# Patient Record
Sex: MALE | Race: WHITE | NOT HISPANIC OR LATINO | Employment: OTHER | URBAN - METROPOLITAN AREA
[De-identification: names, ages, dates, MRNs, and addresses within clinical notes are randomized per-mention and may not be internally consistent; named-entity substitution may affect disease eponyms.]

---

## 2018-07-20 ENCOUNTER — HOSPITAL ENCOUNTER (EMERGENCY)
Facility: HOSPITAL | Age: 43
Discharge: HOME | End: 2018-07-20
Attending: EMERGENCY MEDICINE
Payer: OTHER GOVERNMENT

## 2018-07-20 VITALS
HEART RATE: 71 BPM | SYSTOLIC BLOOD PRESSURE: 140 MMHG | RESPIRATION RATE: 16 BRPM | WEIGHT: 176 LBS | HEIGHT: 71 IN | DIASTOLIC BLOOD PRESSURE: 74 MMHG | OXYGEN SATURATION: 100 % | TEMPERATURE: 99 F | BODY MASS INDEX: 24.64 KG/M2

## 2018-07-20 DIAGNOSIS — M25.579 ANKLE PAIN: ICD-10-CM

## 2018-07-20 DIAGNOSIS — M25.472 LEFT ANKLE SWELLING: ICD-10-CM

## 2018-07-20 DIAGNOSIS — M25.572 LEFT ANKLE PAIN: Primary | ICD-10-CM

## 2018-07-20 PROCEDURE — 25000003 PHARM REV CODE 250: Performed by: EMERGENCY MEDICINE

## 2018-07-20 PROCEDURE — 99283 EMERGENCY DEPT VISIT LOW MDM: CPT

## 2018-07-20 PROCEDURE — 25000003 PHARM REV CODE 250: Performed by: PHYSICIAN ASSISTANT

## 2018-07-20 PROCEDURE — 99283 EMERGENCY DEPT VISIT LOW MDM: CPT | Mod: ,,, | Performed by: PHYSICIAN ASSISTANT

## 2018-07-20 RX ORDER — NAPROXEN 500 MG/1
500 TABLET ORAL
Status: COMPLETED | OUTPATIENT
Start: 2018-07-20 | End: 2018-07-20

## 2018-07-20 RX ORDER — HYDROCODONE BITARTRATE AND ACETAMINOPHEN 5; 325 MG/1; MG/1
1 TABLET ORAL EVERY 6 HOURS PRN
Qty: 5 TABLET | Refills: 0 | Status: SHIPPED | OUTPATIENT
Start: 2018-07-20 | End: 2018-07-30

## 2018-07-20 RX ORDER — NAPROXEN 500 MG/1
500 TABLET ORAL 2 TIMES DAILY WITH MEALS
Qty: 20 TABLET | Refills: 0 | Status: SHIPPED | OUTPATIENT
Start: 2018-07-20

## 2018-07-20 RX ORDER — HYDROCODONE BITARTRATE AND ACETAMINOPHEN 5; 325 MG/1; MG/1
1 TABLET ORAL
Status: COMPLETED | OUTPATIENT
Start: 2018-07-20 | End: 2018-07-20

## 2018-07-20 RX ADMIN — NAPROXEN 500 MG: 500 TABLET ORAL at 06:07

## 2018-07-20 RX ADMIN — HYDROCODONE BITARTRATE AND ACETAMINOPHEN 1 TABLET: 5; 325 TABLET ORAL at 04:07

## 2018-07-20 NOTE — ED TRIAGE NOTES
Presents to ER with pain and swelling to his left ankle.  States that he jumped off of a hand rail and roll his ankle.  Patient's name and date of birth checked and is correct.  LOC: The patient is awake, alert and aware of environment with an appropriate affect, the patient is oriented x 3 and speaking appropriately.  APPEARANCE: Patient resting comfortably and in no acute distress, patient is clean and well groomed, patient's clothing is properly fastened.  CARDIOVASCULAR:  Heart rate regular and even with no peripheral edema noted.  SKIN: The skin is warm and dry, patient has normal skin turgor and moist mucus membranes, skin intact, no breakdown or brusing noted.   MUSKULOSKELETAL: Patient moving all extremities well, obvious swelling and deformities noted left ankle.  RESPIRATORY: Airway is open and patent, respirations are spontaneous, patient has a normal effort and rate.

## 2018-07-20 NOTE — ED PROVIDER NOTES
Encounter Date: 7/20/2018       History     Chief Complaint   Patient presents with    Ankle Injury     jumped down from something and now c/o left ankle pain and swelling     41 y/o WM with no medical history presents to the ED c/o L lateral ankle pain and swelling. He jumped off a hand railing (about 3 to 3.5 feet off the ground) and inverted his L ankle 30 minutes prior to arrival. He has been applying ice to the area. He has not taken any pain medication PTA and reports 6-7/10 pain. He is unable to bear weight onto the L foot. He does report a history of fracture to the same ankle in the past but did not require surgical intervention. He does report some numbness to the top of the foot. He denies f/c, chest pain, SOB, abdominal pain, n/v.       The history is provided by the patient.     Review of patient's allergies indicates:  No Known Allergies  History reviewed. No pertinent past medical history.  History reviewed. No pertinent surgical history.  Family History   Problem Relation Age of Onset    Adopted: Yes     Social History   Substance Use Topics    Smoking status: Never Smoker    Smokeless tobacco: Never Used    Alcohol use No     Review of Systems   Constitutional: Negative for chills and fever.   HENT: Negative for congestion, rhinorrhea and sore throat.    Eyes: Negative for photophobia and visual disturbance.   Respiratory: Negative for shortness of breath.    Cardiovascular: Negative for chest pain.   Gastrointestinal: Negative for abdominal pain, constipation, diarrhea, nausea and vomiting.   Genitourinary: Negative for dysuria and hematuria.   Musculoskeletal: Positive for arthralgias, gait problem and joint swelling. Negative for neck pain and neck stiffness.   Skin: Negative for rash and wound.   Neurological: Positive for numbness. Negative for dizziness, weakness, light-headedness and headaches.   Psychiatric/Behavioral: Negative for confusion.       Physical Exam     Initial Vitals  [07/20/18 1619]   BP Pulse Resp Temp SpO2   127/60 81 18 98.4 °F (36.9 °C) 98 %      MAP       --         Physical Exam    Nursing note and vitals reviewed.  Constitutional: He appears well-developed and well-nourished. He is not diaphoretic. No distress.   HENT:   Head: Normocephalic and atraumatic.   Neck: Normal range of motion. Neck supple.   Cardiovascular: Normal rate, regular rhythm and normal heart sounds. Exam reveals no gallop and no friction rub.    No murmur heard.  Pulmonary/Chest: Breath sounds normal. He has no wheezes. He has no rhonchi. He has no rales.   Abdominal: Soft. Bowel sounds are normal. There is no tenderness. There is no rebound and no guarding.   Musculoskeletal:        Left ankle: He exhibits decreased range of motion, swelling and deformity. He exhibits no laceration. Tenderness. Lateral malleolus tenderness found.   Numbness noted to the superior mid foot. Unable to palpate L DP pulse but auscultated via doppler. Significant swelling noted to the L lateral ankle.    Neurological: He is alert and oriented to person, place, and time.   Skin: Skin is warm and dry. No erythema.   Psychiatric: He has a normal mood and affect.         ED Course   Procedures  Labs Reviewed - No data to display       Imaging Results          X-Ray Tibia Fibula 2 View Left (Final result)  Result time 07/20/18 17:26:16    Final result by Daphnie Ospina MD (07/20/18 17:26:16)                 Impression:      As above      Electronically signed by: Daphnie Ospina MD  Date:    07/20/2018  Time:    17:26             Narrative:    EXAMINATION:  XR TIBIA FIBULA 2 VIEW LEFT    CLINICAL HISTORY:  Pain in left ankle and joints of left foot    TECHNIQUE:  AP and lateral views of the left tibia and fibula were performed.    COMPARISON:  None.    FINDINGS:  The proximal tibia and fibula are intact, demonstrating no osseous abnormality.  Please see report of ankle for findings at the ankle.                                X-Ray Foot Complete Left (Final result)  Result time 07/20/18 17:31:37    Final result by Daphnie Ospina MD (07/20/18 17:31:37)                 Impression:      Significant soft tissue swelling over the lateral malleolus as well as a few small calcifications just adjacent to the distal fibula, likely relating to fracture.      Electronically signed by: Daphnie Ospina MD  Date:    07/20/2018  Time:    17:31             Narrative:    EXAMINATION:  XR FOOT COMPLETE 3 VIEW LEFT; XR ANKLE COMPLETE 3 VIEW LEFT    CLINICAL HISTORY:  L lateral ankle pain, swelling, deformity;.  Pain in unspecified ankle and joints of unspecified foot    TECHNIQUE:  AP, lateral and oblique views of the left foot were performed.    COMPARISON:  None    FINDINGS:  There are a few small calcifications adjacent to the distal fibula and significant soft tissue swelling over the lateral malleolus, indicating these are likely small fracture fragments.  There are more corticated calcifications adjacent to the medial malleolus, without significant overlying soft tissue swelling, likely related to chronic changes perhaps remote fracture.  Posterior calcaneal spurring is present.  The ankle mortise is intact.                               X-Ray Ankle Complete Left (Final result)  Result time 07/20/18 17:31:37    Final result by Daphnie Ospina MD (07/20/18 17:31:37)                 Impression:      Significant soft tissue swelling over the lateral malleolus as well as a few small calcifications just adjacent to the distal fibula, likely relating to fracture.      Electronically signed by: Daphnie Ospina MD  Date:    07/20/2018  Time:    17:31             Narrative:    EXAMINATION:  XR FOOT COMPLETE 3 VIEW LEFT; XR ANKLE COMPLETE 3 VIEW LEFT    CLINICAL HISTORY:  L lateral ankle pain, swelling, deformity;.  Pain in unspecified ankle and joints of unspecified foot    TECHNIQUE:  AP, lateral and oblique views of the left foot were  performed.    COMPARISON:  None    FINDINGS:  There are a few small calcifications adjacent to the distal fibula and significant soft tissue swelling over the lateral malleolus, indicating these are likely small fracture fragments.  There are more corticated calcifications adjacent to the medial malleolus, without significant overlying soft tissue swelling, likely related to chronic changes perhaps remote fracture.  Posterior calcaneal spurring is present.  The ankle mortise is intact.                                 Medical Decision Making:   History:   Old Medical Records: I decided to obtain old medical records.  Clinical Tests:   Radiological Study: Ordered and Reviewed       APC / Resident Notes:   41 y/o WM with no medical history presents to the ED c/o L lateral ankle pain and swelling. VSS. RRR. Significant swelling and tenderness noted to the L lateral ankle. Numbness to the superior mid foot. Unable to palpate L DP pulse but auscultated via doppler. DDx includes but is not limited to ankle sprain, ankle fracture, ankle dislocation. Will get xrays, give norco.    Xray L tib/fib, L ankle, L foot: small calcifications adjacent to the distal fibular, likely fracture.     L ankle placed in ankle air splint. Patient provided with crutches.     He is from [place] - provided with a CD of images and print out of xray reports.     He was discharged with prescriptions for naproxen and norco.  He will follow up with his orthopedist at home.  All of the patient's questions were answered.  I reviewed the patient's chart and imaging and discussed the case with my supervising physician.                    Clinical Impression:   The primary encounter diagnosis was Left ankle pain. Diagnoses of Ankle pain and Left ankle swelling were also pertinent to this visit.      Disposition:   Disposition: Discharged  Condition: Stable                        Tessy Torres PA-C  07/20/18 2018